# Patient Record
Sex: MALE | Race: WHITE
[De-identification: names, ages, dates, MRNs, and addresses within clinical notes are randomized per-mention and may not be internally consistent; named-entity substitution may affect disease eponyms.]

---

## 2021-07-27 ENCOUNTER — HOSPITAL ENCOUNTER (EMERGENCY)
Dept: HOSPITAL 11 - JP.ED | Age: 56
Discharge: HOME | End: 2021-07-27
Payer: SELF-PAY

## 2021-07-27 DIAGNOSIS — R07.89: Primary | ICD-10-CM

## 2021-07-27 DIAGNOSIS — R10.13: ICD-10-CM

## 2021-07-27 NOTE — CRLCR
For Patients:  As a result of the 21st Century Cures Act, medical imaging 

exams and procedure reports are released immediately into your electronic 

medical record.  You may view this report before your referring provider.  

If you have questions, please contact your health care provider.



INDICATION: dyspnea



INDICATION:



Dyspnea. 



TECHNIQUE:



Chest 2 views.



COMPARISON:



None 



FINDINGS:



Cardiovascular and mediastinum:  Heart size and vasculature are normal in 

caliber and appearance.  Mediastinum is within normal limits.  



Lungs and pleural spaces:  Lungs are clear.  No sign of infiltrate or mass. 

 No sign of pleural effusion.  No pneumothorax.  



Bones and soft tissues:  No significant findings.  



IMPRESSION:



Lungs are clear.



Dictated by Yonis Elizalde MD @ 7/27/2021 1:09:35 PM



Dictated by: Yonis Elizalde MD @ 07/27/2021 13:09:55



(Electronically Signed)